# Patient Record
Sex: MALE | Race: WHITE | NOT HISPANIC OR LATINO | URBAN - METROPOLITAN AREA
[De-identification: names, ages, dates, MRNs, and addresses within clinical notes are randomized per-mention and may not be internally consistent; named-entity substitution may affect disease eponyms.]

---

## 2024-05-29 ENCOUNTER — INPATIENT (INPATIENT)
Facility: HOSPITAL | Age: 79
LOS: 4 days | Discharge: HOME | DRG: 373 | End: 2024-06-03
Attending: STUDENT IN AN ORGANIZED HEALTH CARE EDUCATION/TRAINING PROGRAM | Admitting: STUDENT IN AN ORGANIZED HEALTH CARE EDUCATION/TRAINING PROGRAM
Payer: MEDICARE

## 2024-05-29 VITALS
RESPIRATION RATE: 20 BRPM | WEIGHT: 182.98 LBS | OXYGEN SATURATION: 100 % | HEIGHT: 69 IN | TEMPERATURE: 96 F | HEART RATE: 76 BPM | SYSTOLIC BLOOD PRESSURE: 109 MMHG | DIASTOLIC BLOOD PRESSURE: 68 MMHG

## 2024-05-29 DIAGNOSIS — K68.3 RETROPERITONEAL HEMATOMA: ICD-10-CM

## 2024-05-29 LAB
A1C WITH ESTIMATED AVERAGE GLUCOSE RESULT: 5.1 % — SIGNIFICANT CHANGE UP (ref 4–5.6)
ALBUMIN SERPL ELPH-MCNC: 2.9 G/DL — LOW (ref 3.3–5.2)
ALBUMIN SERPL ELPH-MCNC: 3.1 G/DL — LOW (ref 3.3–5.2)
ALP SERPL-CCNC: 60 U/L — SIGNIFICANT CHANGE UP (ref 40–120)
ALP SERPL-CCNC: 62 U/L — SIGNIFICANT CHANGE UP (ref 40–120)
ALT FLD-CCNC: 12 U/L — SIGNIFICANT CHANGE UP
ALT FLD-CCNC: 12 U/L — SIGNIFICANT CHANGE UP
ANION GAP SERPL CALC-SCNC: 13 MMOL/L — SIGNIFICANT CHANGE UP (ref 5–17)
ANION GAP SERPL CALC-SCNC: 14 MMOL/L — SIGNIFICANT CHANGE UP (ref 5–17)
ANION GAP SERPL CALC-SCNC: 14 MMOL/L — SIGNIFICANT CHANGE UP (ref 5–17)
APTT BLD: 23.5 SEC — LOW (ref 24.5–35.6)
APTT BLD: 23.7 SEC — LOW (ref 24.5–35.6)
APTT BLD: 24.6 SEC — SIGNIFICANT CHANGE UP (ref 24.5–35.6)
AST SERPL-CCNC: 16 U/L — SIGNIFICANT CHANGE UP
AST SERPL-CCNC: 17 U/L — SIGNIFICANT CHANGE UP
BILIRUB SERPL-MCNC: 1 MG/DL — SIGNIFICANT CHANGE UP (ref 0.4–2)
BILIRUB SERPL-MCNC: 1.8 MG/DL — SIGNIFICANT CHANGE UP (ref 0.4–2)
BLD GP AB SCN SERPL QL: SIGNIFICANT CHANGE UP
BUN SERPL-MCNC: 28.5 MG/DL — HIGH (ref 8–20)
BUN SERPL-MCNC: 30.5 MG/DL — HIGH (ref 8–20)
BUN SERPL-MCNC: 30.6 MG/DL — HIGH (ref 8–20)
CALCIUM SERPL-MCNC: 8.3 MG/DL — LOW (ref 8.4–10.5)
CALCIUM SERPL-MCNC: 8.6 MG/DL — SIGNIFICANT CHANGE UP (ref 8.4–10.5)
CALCIUM SERPL-MCNC: 8.9 MG/DL — SIGNIFICANT CHANGE UP (ref 8.4–10.5)
CHLORIDE SERPL-SCNC: 104 MMOL/L — SIGNIFICANT CHANGE UP (ref 96–108)
CHLORIDE SERPL-SCNC: 104 MMOL/L — SIGNIFICANT CHANGE UP (ref 96–108)
CHLORIDE SERPL-SCNC: 107 MMOL/L — SIGNIFICANT CHANGE UP (ref 96–108)
CO2 SERPL-SCNC: 17 MMOL/L — LOW (ref 22–29)
CREAT SERPL-MCNC: 0.8 MG/DL — SIGNIFICANT CHANGE UP (ref 0.5–1.3)
CREAT SERPL-MCNC: 0.95 MG/DL — SIGNIFICANT CHANGE UP (ref 0.5–1.3)
CREAT SERPL-MCNC: 0.95 MG/DL — SIGNIFICANT CHANGE UP (ref 0.5–1.3)
EGFR: 81 ML/MIN/1.73M2 — SIGNIFICANT CHANGE UP
EGFR: 81 ML/MIN/1.73M2 — SIGNIFICANT CHANGE UP
EGFR: 89 ML/MIN/1.73M2 — SIGNIFICANT CHANGE UP
ESTIMATED AVERAGE GLUCOSE: 100 MG/DL — SIGNIFICANT CHANGE UP (ref 68–114)
FIBRINOGEN PPP-MCNC: 235 MG/DL — SIGNIFICANT CHANGE UP (ref 200–450)
FIBRINOGEN PPP-MCNC: 245 MG/DL — SIGNIFICANT CHANGE UP (ref 200–450)
GAS PNL BLDA: SIGNIFICANT CHANGE UP
GLUCOSE BLDC GLUCOMTR-MCNC: 199 MG/DL — HIGH (ref 70–99)
GLUCOSE SERPL-MCNC: 153 MG/DL — HIGH (ref 70–99)
GLUCOSE SERPL-MCNC: 217 MG/DL — HIGH (ref 70–99)
GLUCOSE SERPL-MCNC: 220 MG/DL — HIGH (ref 70–99)
HCT VFR BLD CALC: 28.4 % — LOW (ref 39–50)
HCT VFR BLD CALC: 30 % — LOW (ref 39–50)
HCT VFR BLD CALC: 33.9 % — LOW (ref 39–50)
HGB BLD-MCNC: 10.2 G/DL — LOW (ref 13–17)
HGB BLD-MCNC: 10.4 G/DL — LOW (ref 13–17)
HGB BLD-MCNC: 11.8 G/DL — LOW (ref 13–17)
INR BLD: 1.01 RATIO — SIGNIFICANT CHANGE UP (ref 0.85–1.18)
INR BLD: 1.05 RATIO — SIGNIFICANT CHANGE UP (ref 0.85–1.18)
INR BLD: 1.05 RATIO — SIGNIFICANT CHANGE UP (ref 0.85–1.18)
MAGNESIUM SERPL-MCNC: 2 MG/DL — SIGNIFICANT CHANGE UP (ref 1.6–2.6)
MAGNESIUM SERPL-MCNC: 2.1 MG/DL — SIGNIFICANT CHANGE UP (ref 1.6–2.6)
MCHC RBC-ENTMCNC: 31.4 PG — SIGNIFICANT CHANGE UP (ref 27–34)
MCHC RBC-ENTMCNC: 31.8 PG — SIGNIFICANT CHANGE UP (ref 27–34)
MCHC RBC-ENTMCNC: 32.1 PG — SIGNIFICANT CHANGE UP (ref 27–34)
MCHC RBC-ENTMCNC: 34.7 GM/DL — SIGNIFICANT CHANGE UP (ref 32–36)
MCHC RBC-ENTMCNC: 34.8 GM/DL — SIGNIFICANT CHANGE UP (ref 32–36)
MCHC RBC-ENTMCNC: 35.9 GM/DL — SIGNIFICANT CHANGE UP (ref 32–36)
MCV RBC AUTO: 89.3 FL — SIGNIFICANT CHANGE UP (ref 80–100)
MCV RBC AUTO: 90.6 FL — SIGNIFICANT CHANGE UP (ref 80–100)
MCV RBC AUTO: 91.4 FL — SIGNIFICANT CHANGE UP (ref 80–100)
MRSA PCR RESULT.: SIGNIFICANT CHANGE UP
PHOSPHATE SERPL-MCNC: 3.9 MG/DL — SIGNIFICANT CHANGE UP (ref 2.4–4.7)
PHOSPHATE SERPL-MCNC: 4.1 MG/DL — SIGNIFICANT CHANGE UP (ref 2.4–4.7)
PLATELET # BLD AUTO: 170 K/UL — SIGNIFICANT CHANGE UP (ref 150–400)
PLATELET # BLD AUTO: 179 K/UL — SIGNIFICANT CHANGE UP (ref 150–400)
PLATELET # BLD AUTO: 188 K/UL — SIGNIFICANT CHANGE UP (ref 150–400)
POTASSIUM SERPL-MCNC: 4.4 MMOL/L — SIGNIFICANT CHANGE UP (ref 3.5–5.3)
POTASSIUM SERPL-MCNC: 4.5 MMOL/L — SIGNIFICANT CHANGE UP (ref 3.5–5.3)
POTASSIUM SERPL-MCNC: 4.8 MMOL/L — SIGNIFICANT CHANGE UP (ref 3.5–5.3)
POTASSIUM SERPL-SCNC: 4.4 MMOL/L — SIGNIFICANT CHANGE UP (ref 3.5–5.3)
POTASSIUM SERPL-SCNC: 4.5 MMOL/L — SIGNIFICANT CHANGE UP (ref 3.5–5.3)
POTASSIUM SERPL-SCNC: 4.8 MMOL/L — SIGNIFICANT CHANGE UP (ref 3.5–5.3)
PROT SERPL-MCNC: 5.2 G/DL — LOW (ref 6.6–8.7)
PROT SERPL-MCNC: 5.5 G/DL — LOW (ref 6.6–8.7)
PROTHROM AB SERPL-ACNC: 11.2 SEC — SIGNIFICANT CHANGE UP (ref 9.5–13)
PROTHROM AB SERPL-ACNC: 11.6 SEC — SIGNIFICANT CHANGE UP (ref 9.5–13)
PROTHROM AB SERPL-ACNC: 11.6 SEC — SIGNIFICANT CHANGE UP (ref 9.5–13)
RBC # BLD: 3.18 M/UL — LOW (ref 4.2–5.8)
RBC # BLD: 3.31 M/UL — LOW (ref 4.2–5.8)
RBC # BLD: 3.71 M/UL — LOW (ref 4.2–5.8)
RBC # FLD: 14.7 % — HIGH (ref 10.3–14.5)
RBC # FLD: 14.8 % — HIGH (ref 10.3–14.5)
RBC # FLD: 15.4 % — HIGH (ref 10.3–14.5)
S AUREUS DNA NOSE QL NAA+PROBE: SIGNIFICANT CHANGE UP
SODIUM SERPL-SCNC: 135 MMOL/L — SIGNIFICANT CHANGE UP (ref 135–145)
SODIUM SERPL-SCNC: 135 MMOL/L — SIGNIFICANT CHANGE UP (ref 135–145)
SODIUM SERPL-SCNC: 137 MMOL/L — SIGNIFICANT CHANGE UP (ref 135–145)
WBC # BLD: 10.01 K/UL — SIGNIFICANT CHANGE UP (ref 3.8–10.5)
WBC # BLD: 10.1 K/UL — SIGNIFICANT CHANGE UP (ref 3.8–10.5)
WBC # BLD: 11.92 K/UL — HIGH (ref 3.8–10.5)
WBC # FLD AUTO: 10.01 K/UL — SIGNIFICANT CHANGE UP (ref 3.8–10.5)
WBC # FLD AUTO: 10.1 K/UL — SIGNIFICANT CHANGE UP (ref 3.8–10.5)
WBC # FLD AUTO: 11.92 K/UL — HIGH (ref 3.8–10.5)

## 2024-05-29 PROCEDURE — 37242 VASC EMBOLIZE/OCCLUDE ARTERY: CPT

## 2024-05-29 PROCEDURE — 36247 INS CATH ABD/L-EXT ART 3RD: CPT | Mod: RT

## 2024-05-29 PROCEDURE — 99291 CRITICAL CARE FIRST HOUR: CPT

## 2024-05-29 PROCEDURE — 99222 1ST HOSP IP/OBS MODERATE 55: CPT

## 2024-05-29 RX ORDER — CHLORHEXIDINE GLUCONATE 213 G/1000ML
1 SOLUTION TOPICAL DAILY
Refills: 0 | Status: DISCONTINUED | OUTPATIENT
Start: 2024-05-29 | End: 2024-06-03

## 2024-05-29 RX ORDER — DOXAZOSIN MESYLATE 4 MG
1 TABLET ORAL
Refills: 0 | DISCHARGE

## 2024-05-29 RX ORDER — FINASTERIDE 5 MG/1
5 TABLET, FILM COATED ORAL DAILY
Refills: 0 | Status: DISCONTINUED | OUTPATIENT
Start: 2024-05-29 | End: 2024-06-03

## 2024-05-29 RX ORDER — ACETAMINOPHEN 500 MG
1000 TABLET ORAL EVERY 6 HOURS
Refills: 0 | Status: DISCONTINUED | OUTPATIENT
Start: 2024-05-29 | End: 2024-06-03

## 2024-05-29 RX ORDER — INSULIN LISPRO 100/ML
VIAL (ML) SUBCUTANEOUS
Refills: 0 | Status: DISCONTINUED | OUTPATIENT
Start: 2024-05-29 | End: 2024-05-30

## 2024-05-29 RX ORDER — SODIUM CHLORIDE 9 MG/ML
1000 INJECTION, SOLUTION INTRAVENOUS
Refills: 0 | Status: DISCONTINUED | OUTPATIENT
Start: 2024-05-29 | End: 2024-05-30

## 2024-05-29 RX ORDER — TAMSULOSIN HYDROCHLORIDE 0.4 MG/1
0.4 CAPSULE ORAL AT BEDTIME
Refills: 0 | Status: DISCONTINUED | OUTPATIENT
Start: 2024-05-29 | End: 2024-05-31

## 2024-05-29 RX ORDER — ATORVASTATIN CALCIUM 80 MG/1
10 TABLET, FILM COATED ORAL AT BEDTIME
Refills: 0 | Status: DISCONTINUED | OUTPATIENT
Start: 2024-05-29 | End: 2024-06-03

## 2024-05-29 RX ORDER — DUTASTERIDE 0.5 MG/1
1 CAPSULE, LIQUID FILLED ORAL
Refills: 0 | DISCHARGE

## 2024-05-29 RX ORDER — DEXTROSE 50 % IN WATER 50 %
12.5 SYRINGE (ML) INTRAVENOUS ONCE
Refills: 0 | Status: DISCONTINUED | OUTPATIENT
Start: 2024-05-29 | End: 2024-05-30

## 2024-05-29 RX ORDER — ATORVASTATIN CALCIUM 80 MG/1
1 TABLET, FILM COATED ORAL
Refills: 0 | DISCHARGE

## 2024-05-29 RX ADMIN — SODIUM CHLORIDE 125 MILLILITER(S): 9 INJECTION, SOLUTION INTRAVENOUS at 16:55

## 2024-05-29 RX ADMIN — ATORVASTATIN CALCIUM 10 MILLIGRAM(S): 80 TABLET, FILM COATED ORAL at 21:00

## 2024-05-29 RX ADMIN — TAMSULOSIN HYDROCHLORIDE 0.4 MILLIGRAM(S): 0.4 CAPSULE ORAL at 22:00

## 2024-05-29 RX ADMIN — Medication 1: at 16:55

## 2024-05-29 NOTE — GOALS OF CARE CONVERSATION - ADVANCED CARE PLANNING - CONVERSATION DETAILS
Patient states that they would like there wife Lakesha Soriano - [phone]  to make decisions for them if the patient was unable to make decisions for themself.Discussed/defined DNR/DNI w/patient, including explaining the use of medications/electricity to restart the heart and the use of ETT/mechanical ventilation.  Patient is clear that they WOULD want chest compressions, shocks or medications to restart the heart should it stop beating or he have a rhythm not compatible with life.  Patient is also clear that they WOULD want to be intubated and put on a ventilator should they require it.     Patient to be: [X] FULL CODE

## 2024-05-29 NOTE — H&P ADULT - ASSESSMENT
ASSESSMENT: 78[place] [facility] [place] possibly [place] artery , on presentation patient complaining of flank pain , denies recent trauma / AC use, patient got 4 unit of PRBCs , 4 L of IVF , HD stable on presentation, AAOx3      PLAN:    - Admit to SICU  - Emergent IR intervention for hge control   - transfuse per parameter   - strict I/Os  - serial H/H   - pain control  - OOB/ambulate  - Plan discussed with Attending, Dr. Barreto

## 2024-05-29 NOTE — PATIENT PROFILE ADULT - FOOD INSECURITY
Received refill request from [facility] for cetirizine  Last refill: 09/15/2016 #30 R-5  Last office visit: 05/09/2017      Per  protocol medication refilled for a 6 month supply and E-prescribed to pharmacy.       no

## 2024-05-29 NOTE — CONSULT NOTE ADULT - ASSESSMENT
Patient is a 78 year-old transferred from [facility] [place] for retroperitoneal bleed. CTA positve for active right adrenal bleed. Patients has received 3 units of PRBC. Patient does require emergent embolization and will need additional contrast for this procedure. Scr was 1.1 prior to CTA today. will hydrate post procedure.     Please call extension [phone] with any questions, concerns or issues regarding above.  Patient is a 78 year-old [place] [facility] [place]. CTA positive for active right adrenal bleed. Patients has received 3 units of PRBC. Patient does require emergent embolization and will need additional contrast for this procedure. Scr was 1.1 prior to CTA today. will hydrate post procedure.     Please call extension [phone] with any questions, concerns or issues regarding above.

## 2024-05-29 NOTE — H&P ADULT - HISTORY OF PRESENT ILLNESS
HPI: 78y Male [place] [facility] [place] possibly from adrenal artery , on presentation patient complaining of flank pain , denies recent trauma / AC use, patient got 4 unit of PRBCs , 4 L of IVF , HD stable on presentation, AAOx3 , per EMS patient received ASA on presentation for suspected MI  , Patient denies fevers/chills, denies lightheadedness/dizziness, denies SOB/chest pain, denies nausea/vomiting,     ROS: 10-system review is otherwise negative except HPI above.      PAST MEDICAL & SURGICAL HISTORY:    FAMILY HISTORY:    Family history not pertinent as reviewed with the patient.    SOCIAL HISTORY:  Denies any toxic habits    ALLERGIES: NKA     Home Medications:      --------------------------------------------------------------------------------------------    PHYSICAL EXAM:   General: NAD, Lying in bed comfortably  Neuro: A+Ox3  HEENT: EOMI, PERRLA, MMM  Cardio: RRR  Resp: Non labored breathing on RA  GI/Abd: Soft, NT/ND, no rebound/guarding, no masses palpated  Vascular: All 4 extremities warm and well perfused.   Pelvis: stable  Musculoskeletal: All 4 extremities moving spontaneously, no limitations, no spinal tenderness.  --------------------------------------------------------------------------------------------    LABS                   CAPILLARY BLOOD GLUCOSE              --------------------------------------------------------------------------------------------       HPI: 78y [place] [facility] [place] extrav possibly from adrenal artery , on presentation patient complaining of flank pain , denies recent trauma / AC use, patient got 4 unit of PRBCs , 4 L of IVF , HD stable on presentation with no pressor requirement , AAOx3 , per EMS patient received ASA on presentation for suspected MI  , Patient denies fevers/chills, denies lightheadedness/dizziness, denies SOB/chest pain, denies nausea/vomiting,     ROS: 10-system review is otherwise negative except HPI above.      PAST MEDICAL & SURGICAL HISTORY:    FAMILY HISTORY:    Family history not pertinent as reviewed with the patient.    SOCIAL HISTORY:  Denies any toxic habits    ALLERGIES: NKA     Home Medications:      --------------------------------------------------------------------------------------------    PHYSICAL EXAM:   General: NAD, Lying in bed comfortably  Neuro: A+Ox3  HEENT: EOMI, PERRLA, MMM  Cardio: RRR  Resp: Non labored breathing on RA  GI/Abd: Soft, NT/ND, no rebound/guarding, no masses palpated  Vascular: All 4 extremities warm and well perfused.   Pelvis: stable  Musculoskeletal: All 4 extremities moving spontaneously, no limitations, no spinal tenderness.  --------------------------------------------------------------------------------------------    LABS                   CAPILLARY BLOOD GLUCOSE              --------------------------------------------------------------------------------------------

## 2024-05-29 NOTE — H&P ADULT - ATTENDING COMMENTS
78-year-old male with waldenstrom macroglobulinemia transferred from [facility] [place] with spontaneous retroperitoneal hematoma and acute hemodynamic instability secondary to hemorrhagic shock     - Admit to SICU   - CTA reviewed and case reviewed. Patient taken to IR for embolization of suprarenal artery pseudoaneurysm   - s/p 4 units PRBC, 1 FFP, 4L IVF   - balanced resuscitation. Consider platelets given recent ASA/Toradol administration   - serial abdominal exams   - trend HCT   - invasive hemodynamic monitoring   - Will need outpatient imaging to r/o malignancy     #Metabolic Acidosis: Monitor with volume resuscitation.   #acute blood loss anemia: trend and transfuse for HGB <7

## 2024-05-29 NOTE — PATIENT PROFILE ADULT - FALL HARM RISK - HARM RISK INTERVENTIONS

## 2024-05-29 NOTE — PATIENT PROFILE ADULT - FALL HARM RISK - FACTORS
Done. Pt informed of all her appts, mailed Ct scan and appt letter, pt will also see if daily have a pulmonary md close to her home and let us know  
I spoke to her, reviewed her studies, numerous findings.  Small lung nodule, bronchiectasis, ASCVD, left kidney cyst, diverticulosis, DJD    She was aware of most of these    Anticipate that we will do another CT scan in 1 year for the lung nodule    Recommend pulmonary consultation for bronchiectasis, please assist with appointment- referral is in    Lipid targets reviewed with regard to ASCVD, she is doing better but LDL is not at target range, will recheck labs in 2 months, orders in, please schedule    (if LDL not at target range will consider changing to Lipitor or Crestor)    Please mail copy of her CT scan which I have printed  
Post procedure
Pain, abdominal, epigastric

## 2024-05-29 NOTE — CONSULT NOTE ADULT - SUBJECTIVE AND OBJECTIVE BOX
Chief Complaint: 77yo Male who presents with lower back pain.  HPI:  Patient is an 78 year-old with a history of wladenstrom macroglobulinemia, HTN, HLD, who presented to [facility] today with right lower back pain since 4am. in the ED, patient synopsized and was found to be hypotensive requiring multiple blood transfusions. CTA revealed active Right RP bleed. Patient was helicoptered to [facility] for emergent IR embolization.     ============================================================================  Medications:  Home Medications:    MEDICATIONS  (STANDING):    MEDICATIONS  (PRN):      Allergies:     ============================================================================  PAST MEDICAL & SURGICAL HISTORY:      FAMILY HISTORY:      Social History:      ============================================================================  Vitals:      Labs (Per [facility] records):  wbc 12.5  rbc 3.08   hgb 9.8  hct 29.7  plt 247      Na 141  K 3.9  Cl 108  CO2 22  Cr 1.1  BUN 29          Imaging:   Pertinent Imaging Reviewed.    ============================================================================

## 2024-05-30 LAB
A1C WITH ESTIMATED AVERAGE GLUCOSE RESULT: 4.9 % — SIGNIFICANT CHANGE UP (ref 4–5.6)
ACETONE SERPL-MCNC: NEGATIVE — SIGNIFICANT CHANGE UP
ANION GAP SERPL CALC-SCNC: 10 MMOL/L — SIGNIFICANT CHANGE UP (ref 5–17)
ANION GAP SERPL CALC-SCNC: 12 MMOL/L — SIGNIFICANT CHANGE UP (ref 5–17)
ANION GAP SERPL CALC-SCNC: 13 MMOL/L — SIGNIFICANT CHANGE UP (ref 5–17)
ANION GAP SERPL CALC-SCNC: 15 MMOL/L — SIGNIFICANT CHANGE UP (ref 5–17)
APPEARANCE UR: CLEAR — SIGNIFICANT CHANGE UP
APTT BLD: 23.9 SEC — LOW (ref 24.5–35.6)
BACTERIA # UR AUTO: ABNORMAL /HPF
BILIRUB UR-MCNC: NEGATIVE — SIGNIFICANT CHANGE UP
BUN SERPL-MCNC: 29.2 MG/DL — HIGH (ref 8–20)
BUN SERPL-MCNC: 33.9 MG/DL — HIGH (ref 8–20)
BUN SERPL-MCNC: 36.6 MG/DL — HIGH (ref 8–20)
BUN SERPL-MCNC: 36.8 MG/DL — HIGH (ref 8–20)
CALCIUM SERPL-MCNC: 6.4 MG/DL — CRITICAL LOW (ref 8.4–10.5)
CALCIUM SERPL-MCNC: 8.1 MG/DL — LOW (ref 8.4–10.5)
CALCIUM SERPL-MCNC: 8.2 MG/DL — LOW (ref 8.4–10.5)
CALCIUM SERPL-MCNC: 8.7 MG/DL — SIGNIFICANT CHANGE UP (ref 8.4–10.5)
CAST: 8 /LPF — HIGH (ref 0–4)
CHLORIDE SERPL-SCNC: 104 MMOL/L — SIGNIFICANT CHANGE UP (ref 96–108)
CHLORIDE SERPL-SCNC: 107 MMOL/L — SIGNIFICANT CHANGE UP (ref 96–108)
CHLORIDE SERPL-SCNC: 107 MMOL/L — SIGNIFICANT CHANGE UP (ref 96–108)
CHLORIDE SERPL-SCNC: 113 MMOL/L — HIGH (ref 96–108)
CO2 SERPL-SCNC: 16 MMOL/L — LOW (ref 22–29)
CO2 SERPL-SCNC: 20 MMOL/L — LOW (ref 22–29)
CO2 SERPL-SCNC: 22 MMOL/L — SIGNIFICANT CHANGE UP (ref 22–29)
CO2 SERPL-SCNC: 23 MMOL/L — SIGNIFICANT CHANGE UP (ref 22–29)
COLOR SPEC: ABNORMAL
CREAT ?TM UR-MCNC: 161 MG/DL — SIGNIFICANT CHANGE UP
CREAT SERPL-MCNC: 0.72 MG/DL — SIGNIFICANT CHANGE UP (ref 0.5–1.3)
CREAT SERPL-MCNC: 0.87 MG/DL — SIGNIFICANT CHANGE UP (ref 0.5–1.3)
CREAT SERPL-MCNC: 1.08 MG/DL — SIGNIFICANT CHANGE UP (ref 0.5–1.3)
CREAT SERPL-MCNC: 1.12 MG/DL — SIGNIFICANT CHANGE UP (ref 0.5–1.3)
DIFF PNL FLD: ABNORMAL
EGFR: 66 ML/MIN/1.73M2 — SIGNIFICANT CHANGE UP
EGFR: 69 ML/MIN/1.73M2 — SIGNIFICANT CHANGE UP
EGFR: 87 ML/MIN/1.73M2 — SIGNIFICANT CHANGE UP
EGFR: 92 ML/MIN/1.73M2 — SIGNIFICANT CHANGE UP
ESTIMATED AVERAGE GLUCOSE: 94 MG/DL — SIGNIFICANT CHANGE UP (ref 68–114)
GLUCOSE BLDC GLUCOMTR-MCNC: 123 MG/DL — HIGH (ref 70–99)
GLUCOSE BLDC GLUCOMTR-MCNC: 135 MG/DL — HIGH (ref 70–99)
GLUCOSE BLDC GLUCOMTR-MCNC: 140 MG/DL — HIGH (ref 70–99)
GLUCOSE SERPL-MCNC: 111 MG/DL — HIGH (ref 70–99)
GLUCOSE SERPL-MCNC: 119 MG/DL — HIGH (ref 70–99)
GLUCOSE SERPL-MCNC: 126 MG/DL — HIGH (ref 70–99)
GLUCOSE SERPL-MCNC: 141 MG/DL — HIGH (ref 70–99)
GLUCOSE UR QL: NEGATIVE MG/DL — SIGNIFICANT CHANGE UP
HCT VFR BLD CALC: 22.5 % — LOW (ref 39–50)
HCT VFR BLD CALC: 23.2 % — LOW (ref 39–50)
HCT VFR BLD CALC: 24.4 % — LOW (ref 39–50)
HCT VFR BLD CALC: 24.8 % — LOW (ref 39–50)
HGB BLD-MCNC: 8 G/DL — LOW (ref 13–17)
HGB BLD-MCNC: 8 G/DL — LOW (ref 13–17)
HGB BLD-MCNC: 8.5 G/DL — LOW (ref 13–17)
HGB BLD-MCNC: 8.9 G/DL — LOW (ref 13–17)
INR BLD: 1.08 RATIO — SIGNIFICANT CHANGE UP (ref 0.85–1.18)
KETONES UR-MCNC: NEGATIVE MG/DL — SIGNIFICANT CHANGE UP
LACTATE SERPL-SCNC: 0.9 MMOL/L — SIGNIFICANT CHANGE UP (ref 0.5–2)
LEUKOCYTE ESTERASE UR-ACNC: NEGATIVE — SIGNIFICANT CHANGE UP
MAGNESIUM SERPL-MCNC: 1.7 MG/DL — SIGNIFICANT CHANGE UP (ref 1.6–2.6)
MAGNESIUM SERPL-MCNC: 2.2 MG/DL — SIGNIFICANT CHANGE UP (ref 1.6–2.6)
MAGNESIUM SERPL-MCNC: 2.2 MG/DL — SIGNIFICANT CHANGE UP (ref 1.8–2.6)
MAGNESIUM SERPL-MCNC: 2.3 MG/DL — SIGNIFICANT CHANGE UP (ref 1.6–2.6)
MCHC RBC-ENTMCNC: 31.8 PG — SIGNIFICANT CHANGE UP (ref 27–34)
MCHC RBC-ENTMCNC: 31.9 PG — SIGNIFICANT CHANGE UP (ref 27–34)
MCHC RBC-ENTMCNC: 32.2 PG — SIGNIFICANT CHANGE UP (ref 27–34)
MCHC RBC-ENTMCNC: 32.4 PG — SIGNIFICANT CHANGE UP (ref 27–34)
MCHC RBC-ENTMCNC: 34.5 GM/DL — SIGNIFICANT CHANGE UP (ref 32–36)
MCHC RBC-ENTMCNC: 34.8 GM/DL — SIGNIFICANT CHANGE UP (ref 32–36)
MCHC RBC-ENTMCNC: 35.6 GM/DL — SIGNIFICANT CHANGE UP (ref 32–36)
MCHC RBC-ENTMCNC: 35.9 GM/DL — SIGNIFICANT CHANGE UP (ref 32–36)
MCV RBC AUTO: 89.9 FL — SIGNIFICANT CHANGE UP (ref 80–100)
MCV RBC AUTO: 91.1 FL — SIGNIFICANT CHANGE UP (ref 80–100)
MCV RBC AUTO: 91.4 FL — SIGNIFICANT CHANGE UP (ref 80–100)
MCV RBC AUTO: 92.4 FL — SIGNIFICANT CHANGE UP (ref 80–100)
NITRITE UR-MCNC: NEGATIVE — SIGNIFICANT CHANGE UP
PH UR: 5.5 — SIGNIFICANT CHANGE UP (ref 5–8)
PHOSPHATE SERPL-MCNC: 3.3 MG/DL — SIGNIFICANT CHANGE UP (ref 2.4–4.7)
PHOSPHATE SERPL-MCNC: 3.8 MG/DL — SIGNIFICANT CHANGE UP (ref 2.4–4.7)
PHOSPHATE SERPL-MCNC: 4.4 MG/DL — SIGNIFICANT CHANGE UP (ref 2.4–4.7)
PHOSPHATE SERPL-MCNC: 4.5 MG/DL — SIGNIFICANT CHANGE UP (ref 2.4–4.7)
PLATELET # BLD AUTO: 134 K/UL — LOW (ref 150–400)
PLATELET # BLD AUTO: 146 K/UL — LOW (ref 150–400)
PLATELET # BLD AUTO: 152 K/UL — SIGNIFICANT CHANGE UP (ref 150–400)
PLATELET # BLD AUTO: 166 K/UL — SIGNIFICANT CHANGE UP (ref 150–400)
POTASSIUM SERPL-MCNC: 3.6 MMOL/L — SIGNIFICANT CHANGE UP (ref 3.5–5.3)
POTASSIUM SERPL-MCNC: 4.1 MMOL/L — SIGNIFICANT CHANGE UP (ref 3.5–5.3)
POTASSIUM SERPL-MCNC: 4.4 MMOL/L — SIGNIFICANT CHANGE UP (ref 3.5–5.3)
POTASSIUM SERPL-MCNC: 4.4 MMOL/L — SIGNIFICANT CHANGE UP (ref 3.5–5.3)
POTASSIUM SERPL-SCNC: 3.6 MMOL/L — SIGNIFICANT CHANGE UP (ref 3.5–5.3)
POTASSIUM SERPL-SCNC: 4.1 MMOL/L — SIGNIFICANT CHANGE UP (ref 3.5–5.3)
POTASSIUM SERPL-SCNC: 4.4 MMOL/L — SIGNIFICANT CHANGE UP (ref 3.5–5.3)
POTASSIUM SERPL-SCNC: 4.4 MMOL/L — SIGNIFICANT CHANGE UP (ref 3.5–5.3)
PROT UR-MCNC: 30 MG/DL
PROTHROM AB SERPL-ACNC: 12 SEC — SIGNIFICANT CHANGE UP (ref 9.5–13)
RBC # BLD: 2.47 M/UL — LOW (ref 4.2–5.8)
RBC # BLD: 2.51 M/UL — LOW (ref 4.2–5.8)
RBC # BLD: 2.67 M/UL — LOW (ref 4.2–5.8)
RBC # BLD: 2.76 M/UL — LOW (ref 4.2–5.8)
RBC # FLD: 15.5 % — HIGH (ref 10.3–14.5)
RBC # FLD: 15.6 % — HIGH (ref 10.3–14.5)
RBC # FLD: 15.7 % — HIGH (ref 10.3–14.5)
RBC # FLD: 15.8 % — HIGH (ref 10.3–14.5)
RBC CASTS # UR COMP ASSIST: 9 /HPF — HIGH (ref 0–4)
SODIUM SERPL-SCNC: 140 MMOL/L — SIGNIFICANT CHANGE UP (ref 135–145)
SODIUM SERPL-SCNC: 140 MMOL/L — SIGNIFICANT CHANGE UP (ref 135–145)
SODIUM SERPL-SCNC: 141 MMOL/L — SIGNIFICANT CHANGE UP (ref 135–145)
SODIUM SERPL-SCNC: 141 MMOL/L — SIGNIFICANT CHANGE UP (ref 135–145)
SODIUM UR-SCNC: 39 MMOL/L — SIGNIFICANT CHANGE UP
SP GR SPEC: >1.03 — HIGH (ref 1–1.03)
SQUAMOUS # UR AUTO: 1 /HPF — SIGNIFICANT CHANGE UP (ref 0–5)
UROBILINOGEN FLD QL: 1 MG/DL — SIGNIFICANT CHANGE UP (ref 0.2–1)
WBC # BLD: 10.1 K/UL — SIGNIFICANT CHANGE UP (ref 3.8–10.5)
WBC # BLD: 10.3 K/UL — SIGNIFICANT CHANGE UP (ref 3.8–10.5)
WBC # BLD: 10.5 K/UL — SIGNIFICANT CHANGE UP (ref 3.8–10.5)
WBC # BLD: 11.34 K/UL — HIGH (ref 3.8–10.5)
WBC # FLD AUTO: 10.1 K/UL — SIGNIFICANT CHANGE UP (ref 3.8–10.5)
WBC # FLD AUTO: 10.3 K/UL — SIGNIFICANT CHANGE UP (ref 3.8–10.5)
WBC # FLD AUTO: 10.5 K/UL — SIGNIFICANT CHANGE UP (ref 3.8–10.5)
WBC # FLD AUTO: 11.34 K/UL — HIGH (ref 3.8–10.5)
WBC UR QL: 9 /HPF — HIGH (ref 0–5)

## 2024-05-30 PROCEDURE — 99233 SBSQ HOSP IP/OBS HIGH 50: CPT

## 2024-05-30 PROCEDURE — 99231 SBSQ HOSP IP/OBS SF/LOW 25: CPT

## 2024-05-30 RX ORDER — HEPARIN SODIUM 5000 [USP'U]/ML
5000 INJECTION INTRAVENOUS; SUBCUTANEOUS EVERY 8 HOURS
Refills: 0 | Status: DISCONTINUED | OUTPATIENT
Start: 2024-05-30 | End: 2024-05-30

## 2024-05-30 RX ORDER — INSULIN LISPRO 100/ML
VIAL (ML) SUBCUTANEOUS EVERY 6 HOURS
Refills: 0 | Status: DISCONTINUED | OUTPATIENT
Start: 2024-05-30 | End: 2024-05-31

## 2024-05-30 RX ORDER — ENOXAPARIN SODIUM 100 MG/ML
40 INJECTION SUBCUTANEOUS EVERY 24 HOURS
Refills: 0 | Status: DISCONTINUED | OUTPATIENT
Start: 2024-05-30 | End: 2024-05-31

## 2024-05-30 RX ORDER — SODIUM CHLORIDE 9 MG/ML
1000 INJECTION, SOLUTION INTRAVENOUS
Refills: 0 | Status: DISCONTINUED | OUTPATIENT
Start: 2024-05-30 | End: 2024-05-30

## 2024-05-30 RX ORDER — HEPARIN SODIUM 5000 [USP'U]/ML
5000 INJECTION INTRAVENOUS; SUBCUTANEOUS ONCE
Refills: 0 | Status: DISCONTINUED | OUTPATIENT
Start: 2024-05-30 | End: 2024-05-30

## 2024-05-30 RX ADMIN — SODIUM CHLORIDE 50 MILLILITER(S): 9 INJECTION, SOLUTION INTRAVENOUS at 08:20

## 2024-05-30 RX ADMIN — CHLORHEXIDINE GLUCONATE 1 APPLICATION(S): 213 SOLUTION TOPICAL at 12:10

## 2024-05-30 RX ADMIN — FINASTERIDE 5 MILLIGRAM(S): 5 TABLET, FILM COATED ORAL at 12:10

## 2024-05-30 RX ADMIN — ATORVASTATIN CALCIUM 10 MILLIGRAM(S): 80 TABLET, FILM COATED ORAL at 21:54

## 2024-05-30 NOTE — CHART NOTE - NSCHARTNOTEFT_GEN_A_CORE
Central Line Removal Note    PROCEDURE NOTE:  Central Line removal    Site:    [R]  IJ Triple Lumen Catheter    Explained the procedure. Placed in Trendelenburg. Dressing taken down, sutures removed, catheter removed and tip intact. Pressure applied for greater than 5 mins, no hematoma or bleeding noted. Patient tolerated procedure well. A dry sterile dressing applied.

## 2024-05-31 LAB
ANION GAP SERPL CALC-SCNC: 11 MMOL/L — SIGNIFICANT CHANGE UP (ref 5–17)
BASOPHILS # BLD AUTO: 0.02 K/UL — SIGNIFICANT CHANGE UP (ref 0–0.2)
BASOPHILS NFR BLD AUTO: 0.2 % — SIGNIFICANT CHANGE UP (ref 0–2)
BUN SERPL-MCNC: 30.1 MG/DL — HIGH (ref 8–20)
CALCIUM SERPL-MCNC: 8 MG/DL — LOW (ref 8.4–10.5)
CHLORIDE SERPL-SCNC: 105 MMOL/L — SIGNIFICANT CHANGE UP (ref 96–108)
CO2 SERPL-SCNC: 22 MMOL/L — SIGNIFICANT CHANGE UP (ref 22–29)
CREAT SERPL-MCNC: 0.8 MG/DL — SIGNIFICANT CHANGE UP (ref 0.5–1.3)
EGFR: 89 ML/MIN/1.73M2 — SIGNIFICANT CHANGE UP
EOSINOPHIL # BLD AUTO: 0 K/UL — SIGNIFICANT CHANGE UP (ref 0–0.5)
EOSINOPHIL NFR BLD AUTO: 0 % — SIGNIFICANT CHANGE UP (ref 0–6)
GLUCOSE SERPL-MCNC: 106 MG/DL — HIGH (ref 70–99)
HCT VFR BLD CALC: 20.8 % — CRITICAL LOW (ref 39–50)
HCT VFR BLD CALC: 21.4 % — LOW (ref 39–50)
HCT VFR BLD CALC: 23.3 % — LOW (ref 39–50)
HCT VFR BLD CALC: 23.7 % — LOW (ref 39–50)
HGB BLD-MCNC: 7.2 G/DL — LOW (ref 13–17)
HGB BLD-MCNC: 7.3 G/DL — LOW (ref 13–17)
HGB BLD-MCNC: 8 G/DL — LOW (ref 13–17)
HGB BLD-MCNC: 8.1 G/DL — LOW (ref 13–17)
IMM GRANULOCYTES NFR BLD AUTO: 0.6 % — SIGNIFICANT CHANGE UP (ref 0–0.9)
LYMPHOCYTES # BLD AUTO: 1.08 K/UL — SIGNIFICANT CHANGE UP (ref 1–3.3)
LYMPHOCYTES # BLD AUTO: 12.4 % — LOW (ref 13–44)
MAGNESIUM SERPL-MCNC: 2.1 MG/DL — SIGNIFICANT CHANGE UP (ref 1.6–2.6)
MANUAL SMEAR VERIFICATION: SIGNIFICANT CHANGE UP
MCHC RBC-ENTMCNC: 31.5 PG — SIGNIFICANT CHANGE UP (ref 27–34)
MCHC RBC-ENTMCNC: 32 PG — SIGNIFICANT CHANGE UP (ref 27–34)
MCHC RBC-ENTMCNC: 32.3 PG — SIGNIFICANT CHANGE UP (ref 27–34)
MCHC RBC-ENTMCNC: 32.7 PG — SIGNIFICANT CHANGE UP (ref 27–34)
MCHC RBC-ENTMCNC: 33.8 GM/DL — SIGNIFICANT CHANGE UP (ref 32–36)
MCHC RBC-ENTMCNC: 34.1 GM/DL — SIGNIFICANT CHANGE UP (ref 32–36)
MCHC RBC-ENTMCNC: 34.6 GM/DL — SIGNIFICANT CHANGE UP (ref 32–36)
MCHC RBC-ENTMCNC: 34.8 GM/DL — SIGNIFICANT CHANGE UP (ref 32–36)
MCV RBC AUTO: 92.2 FL — SIGNIFICANT CHANGE UP (ref 80–100)
MCV RBC AUTO: 92.8 FL — SIGNIFICANT CHANGE UP (ref 80–100)
MCV RBC AUTO: 94.5 FL — SIGNIFICANT CHANGE UP (ref 80–100)
MCV RBC AUTO: 94.8 FL — SIGNIFICANT CHANGE UP (ref 80–100)
MONOCYTES # BLD AUTO: 0.98 K/UL — HIGH (ref 0–0.9)
MONOCYTES NFR BLD AUTO: 11.2 % — SIGNIFICANT CHANGE UP (ref 2–14)
NEUTROPHILS # BLD AUTO: 6.59 K/UL — SIGNIFICANT CHANGE UP (ref 1.8–7.4)
NEUTROPHILS NFR BLD AUTO: 75.6 % — SIGNIFICANT CHANGE UP (ref 43–77)
PHOSPHATE SERPL-MCNC: 3 MG/DL — SIGNIFICANT CHANGE UP (ref 2.4–4.7)
PLAT MORPH BLD: NORMAL — SIGNIFICANT CHANGE UP
PLATELET # BLD AUTO: 129 K/UL — LOW (ref 150–400)
PLATELET # BLD AUTO: 132 K/UL — LOW (ref 150–400)
PLATELET # BLD AUTO: 164 K/UL — SIGNIFICANT CHANGE UP (ref 150–400)
PLATELET # BLD AUTO: 166 K/UL — SIGNIFICANT CHANGE UP (ref 150–400)
POLYCHROMASIA BLD QL SMEAR: SLIGHT — SIGNIFICANT CHANGE UP
POTASSIUM SERPL-MCNC: 4.5 MMOL/L — SIGNIFICANT CHANGE UP (ref 3.5–5.3)
POTASSIUM SERPL-SCNC: 4.5 MMOL/L — SIGNIFICANT CHANGE UP (ref 3.5–5.3)
RBC # BLD: 2.2 M/UL — LOW (ref 4.2–5.8)
RBC # BLD: 2.32 M/UL — LOW (ref 4.2–5.8)
RBC # BLD: 2.5 M/UL — LOW (ref 4.2–5.8)
RBC # BLD: 2.51 M/UL — LOW (ref 4.2–5.8)
RBC # FLD: 15 % — HIGH (ref 10.3–14.5)
RBC # FLD: 15.2 % — HIGH (ref 10.3–14.5)
RBC # FLD: 15.4 % — HIGH (ref 10.3–14.5)
RBC # FLD: 15.6 % — HIGH (ref 10.3–14.5)
RBC BLD AUTO: ABNORMAL
SODIUM SERPL-SCNC: 138 MMOL/L — SIGNIFICANT CHANGE UP (ref 135–145)
WBC # BLD: 8.36 K/UL — SIGNIFICANT CHANGE UP (ref 3.8–10.5)
WBC # BLD: 8.72 K/UL — SIGNIFICANT CHANGE UP (ref 3.8–10.5)
WBC # BLD: 9.06 K/UL — SIGNIFICANT CHANGE UP (ref 3.8–10.5)
WBC # BLD: 9.65 K/UL — SIGNIFICANT CHANGE UP (ref 3.8–10.5)
WBC # FLD AUTO: 8.36 K/UL — SIGNIFICANT CHANGE UP (ref 3.8–10.5)
WBC # FLD AUTO: 8.72 K/UL — SIGNIFICANT CHANGE UP (ref 3.8–10.5)
WBC # FLD AUTO: 9.06 K/UL — SIGNIFICANT CHANGE UP (ref 3.8–10.5)
WBC # FLD AUTO: 9.65 K/UL — SIGNIFICANT CHANGE UP (ref 3.8–10.5)

## 2024-05-31 PROCEDURE — 99231 SBSQ HOSP IP/OBS SF/LOW 25: CPT

## 2024-05-31 RX ORDER — POTASSIUM CHLORIDE 20 MEQ
40 PACKET (EA) ORAL ONCE
Refills: 0 | Status: COMPLETED | OUTPATIENT
Start: 2024-05-31 | End: 2024-05-31

## 2024-05-31 RX ORDER — DOXAZOSIN MESYLATE 4 MG
2 TABLET ORAL AT BEDTIME
Refills: 0 | Status: DISCONTINUED | OUTPATIENT
Start: 2024-05-31 | End: 2024-06-03

## 2024-05-31 RX ORDER — ENOXAPARIN SODIUM 100 MG/ML
40 INJECTION SUBCUTANEOUS EVERY 24 HOURS
Refills: 0 | Status: DISCONTINUED | OUTPATIENT
Start: 2024-05-31 | End: 2024-06-03

## 2024-05-31 RX ADMIN — Medication 40 MILLIEQUIVALENT(S): at 12:51

## 2024-05-31 RX ADMIN — Medication 2 MILLIGRAM(S): at 22:17

## 2024-05-31 RX ADMIN — ENOXAPARIN SODIUM 40 MILLIGRAM(S): 100 INJECTION SUBCUTANEOUS at 12:51

## 2024-05-31 RX ADMIN — FINASTERIDE 5 MILLIGRAM(S): 5 TABLET, FILM COATED ORAL at 12:51

## 2024-05-31 RX ADMIN — ATORVASTATIN CALCIUM 10 MILLIGRAM(S): 80 TABLET, FILM COATED ORAL at 22:17

## 2024-05-31 RX ADMIN — CHLORHEXIDINE GLUCONATE 1 APPLICATION(S): 213 SOLUTION TOPICAL at 12:51

## 2024-05-31 NOTE — CHART NOTE - NSCHARTNOTEFT_GEN_A_CORE
SICU TRANSFER NOTE  -----------------------------  ICU Admission Date: XXXXX  Transfer Date: 05-31-24 @ 17:42    Admission Diagnosis: Renal pseudoaneurysm , acute blood loss anemia      Active Problems/injuries: retroperitoneal hematoma    Procedures: 5/29 IR embo Rt inferior suprarenal aa and inferior adrenal aa     Consultants:  [ ] Cardiology  [ ] Endocrine  [ ] Infectious Disease  [ ] Medicine  [ ]Neurosurgery  [ ] Ortho       [ ] Weight Bearing Status:  [ ] Palliative       [ ] Advanced Directives:    [ ] Physical Medicine and Rehab       [ ] Disposition :   [ ] Plastics  [ ] Pulmonary    Medications  acetaminophen   IVPB .. 1000 milliGRAM(s) IV Intermittent every 6 hours PRN  atorvastatin 10 milliGRAM(s) Oral at bedtime  chlorhexidine 2% Cloths 1 Application(s) Topical daily  doxazosin 2 milliGRAM(s) Oral at bedtime  enoxaparin Injectable 40 milliGRAM(s) SubCutaneous every 24 hours  finasteride 5 milliGRAM(s) Oral daily      [x ] I attest I have reviewed and reconciled all medications prior to transfer    IV Fluids    Indication: XXXXXX    Antibiotics:    Indication: XXXXXXX End Date:XXXXXXX      I have discussed this case with RAJINDER Garces upon transfer and all questions regarding ICU course were answered.  The following items are to be followed up:      1. daily hgb, has been stable at around 8,  Had slow drift down to hgb of 8 today.  2. TOV (removed at 1700)  3. Monitoring Cr, downtrending  4. preparation of DC SICU TRANSFER NOTE  -----------------------------  ICU Admission Date: XXXXX  Transfer Date: 05-31-24 @ 17:42    Admission Diagnosis: Renal pseudoaneurysm , acute blood loss anemia      Active Problems/injuries: retroperitoneal hematoma    Procedures: 5/29 IR embo Rt inferior suprarenal aa and inferior adrenal aa     Consultants:  [ ] Cardiology  [ ] Endocrine  [ ] Infectious Disease  [ ] Medicine  [ ]Neurosurgery  [ ] Ortho       [ ] Weight Bearing Status:  [ ] Palliative       [ ] Advanced Directives:    [ ] Physical Medicine and Rehab       [ ] Disposition :   [ ] Plastics  [ ] Pulmonary    Medications  acetaminophen   IVPB .. 1000 milliGRAM(s) IV Intermittent every 6 hours PRN  atorvastatin 10 milliGRAM(s) Oral at bedtime  chlorhexidine 2% Cloths 1 Application(s) Topical daily  doxazosin 2 milliGRAM(s) Oral at bedtime  enoxaparin Injectable 40 milliGRAM(s) SubCutaneous every 24 hours  finasteride 5 milliGRAM(s) Oral daily      [x ] I attest I have reviewed and reconciled all medications prior to transfer    IV Fluids    Indication: XXXXXX    Antibiotics:    Indication: XXXXXXX End Date:XXXXXXX      I have discussed this case with RAJINDER Garces upon transfer and all questions regarding ICU course were answered.  The following items are to be followed up:      1. daily hgb, has been stable at around 8  2. TOV (removed at 1700)  3. Monitoring Cr, downtrending  4.  Lovenox started 5/31  5. Pt aware of 2.7cm Lt cyst  6. preparation of DC    FULL CODE

## 2024-05-31 NOTE — PHYSICAL THERAPY INITIAL EVALUATION ADULT - PERTINENT HX OF CURRENT PROBLEM, REHAB EVAL
78y Male Transferred from [place] complaining of flank pain and admitted with Retroperitoneal hematoma 2*2 active extravation from adrenal artery. Pt is s/p emobolization of inferior suprarenal artery with gel foam and coils.

## 2024-05-31 NOTE — CHART NOTE - NSCHARTNOTEFT_GEN_A_CORE
SICU TRANSFER NOTE    79 [place] anjelica [place] [facility] [place] from adrenal artery causing RP hematoma.  Hgb there was 9.8. He received 4 PRBC and 4L IVF prior to transfer. Hgb here 11.8 and drifted to 8.1 today.  He did receive 1 FFP in this hospital but he did NOT receive any PRBC in this hospital up to this point.   Mild increased Cr but improved.  Making 50 cc urine per hour.   Pelayo removed.  Started on Prophylactic Lovenox today  -----------------------------  ICU Admission Date: 5/29/24  Transfer Date: 05-31-24 @ 12:04    Admission Diagnosis:  1. Retroperitoneal bleed  2. Acute blood loss anemia  3. Metabolic acidosis    1. Anemia    Procedures:   5/29: IR 2 coil embolization with gel  foam to right inferior suprarenal artery and inferior adrenal artery.    Consultants:    IR    Medications  acetaminophen   IVPB .. 1000 milliGRAM(s) IV Intermittent every 6 hours PRN  atorvastatin 10 milliGRAM(s) Oral at bedtime  chlorhexidine 2% Cloths 1 Application(s) Topical daily  doxazosin 2 milliGRAM(s) Oral at bedtime  enoxaparin Injectable 40 milliGRAM(s) SubCutaneous every 24 hours  finasteride 5 milliGRAM(s) Oral daily  potassium chloride    Tablet ER 40 milliEquivalent(s) Oral once      [x] I attest I have reviewed and reconciled all medications prior to transfer        I have discussed this case with ACS team upon transfer and all questions regarding ICU course were answered.  The following items are to be followed up:    1. daily hgb.  Had slow drift down to hgb of 8 today.  2. TOV  3. Monitoring Cr.  4. preparation of DC

## 2024-05-31 NOTE — PHYSICAL THERAPY INITIAL EVALUATION ADULT - ADDITIONAL COMMENTS
Pt lives in a private home with his spouse. 2 steps to enter with handrails, 12 steps inside with handrails. Pt was independent PTA without an assist device. Pt owns no DME.

## 2024-06-01 LAB
ANION GAP SERPL CALC-SCNC: 12 MMOL/L — SIGNIFICANT CHANGE UP (ref 5–17)
BUN SERPL-MCNC: 18 MG/DL — SIGNIFICANT CHANGE UP (ref 8–20)
CALCIUM SERPL-MCNC: 8.2 MG/DL — LOW (ref 8.4–10.5)
CHLORIDE SERPL-SCNC: 102 MMOL/L — SIGNIFICANT CHANGE UP (ref 96–108)
CO2 SERPL-SCNC: 20 MMOL/L — LOW (ref 22–29)
CREAT SERPL-MCNC: 0.74 MG/DL — SIGNIFICANT CHANGE UP (ref 0.5–1.3)
EGFR: 92 ML/MIN/1.73M2 — SIGNIFICANT CHANGE UP
GLUCOSE SERPL-MCNC: 94 MG/DL — SIGNIFICANT CHANGE UP (ref 70–99)
HCT VFR BLD CALC: 20.9 % — CRITICAL LOW (ref 39–50)
HCT VFR BLD CALC: 21.2 % — LOW (ref 39–50)
HCT VFR BLD CALC: 22.1 % — LOW (ref 39–50)
HGB BLD-MCNC: 7.1 G/DL — LOW (ref 13–17)
HGB BLD-MCNC: 7.2 G/DL — LOW (ref 13–17)
HGB BLD-MCNC: 7.5 G/DL — LOW (ref 13–17)
MAGNESIUM SERPL-MCNC: 2.1 MG/DL — SIGNIFICANT CHANGE UP (ref 1.6–2.6)
MCHC RBC-ENTMCNC: 31.8 PG — SIGNIFICANT CHANGE UP (ref 27–34)
MCHC RBC-ENTMCNC: 32 PG — SIGNIFICANT CHANGE UP (ref 27–34)
MCHC RBC-ENTMCNC: 32.3 PG — SIGNIFICANT CHANGE UP (ref 27–34)
MCHC RBC-ENTMCNC: 33.5 GM/DL — SIGNIFICANT CHANGE UP (ref 32–36)
MCHC RBC-ENTMCNC: 33.9 GM/DL — SIGNIFICANT CHANGE UP (ref 32–36)
MCHC RBC-ENTMCNC: 34.4 GM/DL — SIGNIFICANT CHANGE UP (ref 32–36)
MCV RBC AUTO: 92.9 FL — SIGNIFICANT CHANGE UP (ref 80–100)
MCV RBC AUTO: 93.6 FL — SIGNIFICANT CHANGE UP (ref 80–100)
MCV RBC AUTO: 96.4 FL — SIGNIFICANT CHANGE UP (ref 80–100)
PHOSPHATE SERPL-MCNC: 2.7 MG/DL — SIGNIFICANT CHANGE UP (ref 2.4–4.7)
PLATELET # BLD AUTO: 130 K/UL — LOW (ref 150–400)
PLATELET # BLD AUTO: 134 K/UL — LOW (ref 150–400)
PLATELET # BLD AUTO: 137 K/UL — LOW (ref 150–400)
POTASSIUM SERPL-MCNC: 4.3 MMOL/L — SIGNIFICANT CHANGE UP (ref 3.5–5.3)
POTASSIUM SERPL-SCNC: 4.3 MMOL/L — SIGNIFICANT CHANGE UP (ref 3.5–5.3)
RBC # BLD: 2.2 M/UL — LOW (ref 4.2–5.8)
RBC # BLD: 2.25 M/UL — LOW (ref 4.2–5.8)
RBC # BLD: 2.36 M/UL — LOW (ref 4.2–5.8)
RBC # FLD: 14.7 % — HIGH (ref 10.3–14.5)
RBC # FLD: 14.7 % — HIGH (ref 10.3–14.5)
RBC # FLD: 15 % — HIGH (ref 10.3–14.5)
SODIUM SERPL-SCNC: 134 MMOL/L — LOW (ref 135–145)
WBC # BLD: 7.05 K/UL — SIGNIFICANT CHANGE UP (ref 3.8–10.5)
WBC # BLD: 7.58 K/UL — SIGNIFICANT CHANGE UP (ref 3.8–10.5)
WBC # BLD: 8.19 K/UL — SIGNIFICANT CHANGE UP (ref 3.8–10.5)
WBC # FLD AUTO: 7.05 K/UL — SIGNIFICANT CHANGE UP (ref 3.8–10.5)
WBC # FLD AUTO: 7.58 K/UL — SIGNIFICANT CHANGE UP (ref 3.8–10.5)
WBC # FLD AUTO: 8.19 K/UL — SIGNIFICANT CHANGE UP (ref 3.8–10.5)

## 2024-06-01 PROCEDURE — 99231 SBSQ HOSP IP/OBS SF/LOW 25: CPT | Mod: GC

## 2024-06-01 PROCEDURE — 74178 CT ABD&PLV WO CNTR FLWD CNTR: CPT | Mod: 26

## 2024-06-01 RX ADMIN — FINASTERIDE 5 MILLIGRAM(S): 5 TABLET, FILM COATED ORAL at 13:02

## 2024-06-01 RX ADMIN — Medication 2 MILLIGRAM(S): at 21:03

## 2024-06-01 RX ADMIN — CHLORHEXIDINE GLUCONATE 1 APPLICATION(S): 213 SOLUTION TOPICAL at 13:02

## 2024-06-01 RX ADMIN — ENOXAPARIN SODIUM 40 MILLIGRAM(S): 100 INJECTION SUBCUTANEOUS at 13:11

## 2024-06-01 RX ADMIN — ATORVASTATIN CALCIUM 10 MILLIGRAM(S): 80 TABLET, FILM COATED ORAL at 21:03

## 2024-06-02 LAB
HCT VFR BLD CALC: 24.7 % — LOW (ref 39–50)
HCT VFR BLD CALC: 25 % — LOW (ref 39–50)
HGB BLD-MCNC: 8.2 G/DL — LOW (ref 13–17)
HGB BLD-MCNC: 8.4 G/DL — LOW (ref 13–17)
MCHC RBC-ENTMCNC: 31.7 PG — SIGNIFICANT CHANGE UP (ref 27–34)
MCHC RBC-ENTMCNC: 32.3 PG — SIGNIFICANT CHANGE UP (ref 27–34)
MCHC RBC-ENTMCNC: 33.2 GM/DL — SIGNIFICANT CHANGE UP (ref 32–36)
MCHC RBC-ENTMCNC: 33.6 GM/DL — SIGNIFICANT CHANGE UP (ref 32–36)
MCV RBC AUTO: 95.4 FL — SIGNIFICANT CHANGE UP (ref 80–100)
MCV RBC AUTO: 96.2 FL — SIGNIFICANT CHANGE UP (ref 80–100)
PLATELET # BLD AUTO: 167 K/UL — SIGNIFICANT CHANGE UP (ref 150–400)
PLATELET # BLD AUTO: 171 K/UL — SIGNIFICANT CHANGE UP (ref 150–400)
RBC # BLD: 2.59 M/UL — LOW (ref 4.2–5.8)
RBC # BLD: 2.6 M/UL — LOW (ref 4.2–5.8)
RBC # FLD: 15.1 % — HIGH (ref 10.3–14.5)
RBC # FLD: 15.3 % — HIGH (ref 10.3–14.5)
WBC # BLD: 7.31 K/UL — SIGNIFICANT CHANGE UP (ref 3.8–10.5)
WBC # BLD: 7.66 K/UL — SIGNIFICANT CHANGE UP (ref 3.8–10.5)
WBC # FLD AUTO: 7.31 K/UL — SIGNIFICANT CHANGE UP (ref 3.8–10.5)
WBC # FLD AUTO: 7.66 K/UL — SIGNIFICANT CHANGE UP (ref 3.8–10.5)

## 2024-06-02 PROCEDURE — 99231 SBSQ HOSP IP/OBS SF/LOW 25: CPT

## 2024-06-02 RX ADMIN — ATORVASTATIN CALCIUM 10 MILLIGRAM(S): 80 TABLET, FILM COATED ORAL at 21:42

## 2024-06-02 RX ADMIN — Medication 2 MILLIGRAM(S): at 21:42

## 2024-06-02 RX ADMIN — FINASTERIDE 5 MILLIGRAM(S): 5 TABLET, FILM COATED ORAL at 13:00

## 2024-06-02 NOTE — PROGRESS NOTE ADULT - NS ATTEND AMEND GEN_ALL_CORE FT
Above assessment noted.  The patient was seen and examined by myself with the surgical PA.  The patient remains without abdominal pain, nausea, or vomit.  The patient received one unit of PRBC's yesterday with a rise in the Hgb from 7.1 to 7.5, this morning it is 8.4.  Abdomen is soft without localizing tenderness, no guarding, no rebound.  Repeat CT scan reveals an increase in the size of the hematoma with a small focus of air.  Will repeat the H/H later today.  If stable may consider possible discharge home. The air on CT scan may be related to the embolization, clinically doubt infection of the hematoma as the WBC is normal and the patient is afebrile.

## 2024-06-03 ENCOUNTER — TRANSCRIPTION ENCOUNTER (OUTPATIENT)
Age: 79
End: 2024-06-03

## 2024-06-03 VITALS
TEMPERATURE: 99 F | HEART RATE: 72 BPM | OXYGEN SATURATION: 93 % | RESPIRATION RATE: 18 BRPM | DIASTOLIC BLOOD PRESSURE: 65 MMHG | SYSTOLIC BLOOD PRESSURE: 135 MMHG

## 2024-06-03 LAB
ANION GAP SERPL CALC-SCNC: 13 MMOL/L — SIGNIFICANT CHANGE UP (ref 5–17)
BLD GP AB SCN SERPL QL: SIGNIFICANT CHANGE UP
BUN SERPL-MCNC: 19 MG/DL — SIGNIFICANT CHANGE UP (ref 8–20)
CALCIUM SERPL-MCNC: 7.9 MG/DL — LOW (ref 8.4–10.5)
CHLORIDE SERPL-SCNC: 101 MMOL/L — SIGNIFICANT CHANGE UP (ref 96–108)
CO2 SERPL-SCNC: 20 MMOL/L — LOW (ref 22–29)
CREAT SERPL-MCNC: 0.72 MG/DL — SIGNIFICANT CHANGE UP (ref 0.5–1.3)
EGFR: 92 ML/MIN/1.73M2 — SIGNIFICANT CHANGE UP
GLUCOSE SERPL-MCNC: 101 MG/DL — HIGH (ref 70–99)
HCT VFR BLD CALC: 22.9 % — LOW (ref 39–50)
HGB BLD-MCNC: 7.6 G/DL — LOW (ref 13–17)
MAGNESIUM SERPL-MCNC: 2 MG/DL — SIGNIFICANT CHANGE UP (ref 1.8–2.6)
MCHC RBC-ENTMCNC: 31.5 PG — SIGNIFICANT CHANGE UP (ref 27–34)
MCHC RBC-ENTMCNC: 33.2 GM/DL — SIGNIFICANT CHANGE UP (ref 32–36)
MCV RBC AUTO: 95 FL — SIGNIFICANT CHANGE UP (ref 80–100)
PHOSPHATE SERPL-MCNC: 3.5 MG/DL — SIGNIFICANT CHANGE UP (ref 2.4–4.7)
PLATELET # BLD AUTO: 168 K/UL — SIGNIFICANT CHANGE UP (ref 150–400)
POTASSIUM SERPL-MCNC: 4 MMOL/L — SIGNIFICANT CHANGE UP (ref 3.5–5.3)
POTASSIUM SERPL-SCNC: 4 MMOL/L — SIGNIFICANT CHANGE UP (ref 3.5–5.3)
RBC # BLD: 2.41 M/UL — LOW (ref 4.2–5.8)
RBC # FLD: 14.9 % — HIGH (ref 10.3–14.5)
SODIUM SERPL-SCNC: 134 MMOL/L — LOW (ref 135–145)
WBC # BLD: 6.59 K/UL — SIGNIFICANT CHANGE UP (ref 3.8–10.5)
WBC # FLD AUTO: 6.59 K/UL — SIGNIFICANT CHANGE UP (ref 3.8–10.5)

## 2024-06-03 PROCEDURE — 86900 BLOOD TYPING SEROLOGIC ABO: CPT

## 2024-06-03 PROCEDURE — 81001 URINALYSIS AUTO W/SCOPE: CPT

## 2024-06-03 PROCEDURE — 85610 PROTHROMBIN TIME: CPT

## 2024-06-03 PROCEDURE — 85384 FIBRINOGEN ACTIVITY: CPT

## 2024-06-03 PROCEDURE — 83036 HEMOGLOBIN GLYCOSYLATED A1C: CPT

## 2024-06-03 PROCEDURE — 84300 ASSAY OF URINE SODIUM: CPT

## 2024-06-03 PROCEDURE — 83735 ASSAY OF MAGNESIUM: CPT

## 2024-06-03 PROCEDURE — 85014 HEMATOCRIT: CPT

## 2024-06-03 PROCEDURE — 75726 ARTERY X-RAYS ABDOMEN: CPT

## 2024-06-03 PROCEDURE — 80048 BASIC METABOLIC PNL TOTAL CA: CPT

## 2024-06-03 PROCEDURE — 84132 ASSAY OF SERUM POTASSIUM: CPT

## 2024-06-03 PROCEDURE — 82435 ASSAY OF BLOOD CHLORIDE: CPT

## 2024-06-03 PROCEDURE — 99238 HOSP IP/OBS DSCHRG MGMT 30/<: CPT

## 2024-06-03 PROCEDURE — 84100 ASSAY OF PHOSPHORUS: CPT

## 2024-06-03 PROCEDURE — 85730 THROMBOPLASTIN TIME PARTIAL: CPT

## 2024-06-03 PROCEDURE — 86923 COMPATIBILITY TEST ELECTRIC: CPT

## 2024-06-03 PROCEDURE — P9016: CPT

## 2024-06-03 PROCEDURE — 74178 CT ABD&PLV WO CNTR FLWD CNTR: CPT | Mod: MC

## 2024-06-03 PROCEDURE — 87640 STAPH A DNA AMP PROBE: CPT

## 2024-06-03 PROCEDURE — 86901 BLOOD TYPING SEROLOGIC RH(D): CPT

## 2024-06-03 PROCEDURE — C1760: CPT

## 2024-06-03 PROCEDURE — 82947 ASSAY GLUCOSE BLOOD QUANT: CPT

## 2024-06-03 PROCEDURE — 82330 ASSAY OF CALCIUM: CPT

## 2024-06-03 PROCEDURE — C1769: CPT

## 2024-06-03 PROCEDURE — 75894 X-RAYS TRANSCATH THERAPY: CPT

## 2024-06-03 PROCEDURE — 75774 ARTERY X-RAY EACH VESSEL: CPT

## 2024-06-03 PROCEDURE — P9059: CPT

## 2024-06-03 PROCEDURE — 85025 COMPLETE CBC W/AUTO DIFF WBC: CPT

## 2024-06-03 PROCEDURE — 85027 COMPLETE CBC AUTOMATED: CPT

## 2024-06-03 PROCEDURE — 80053 COMPREHEN METABOLIC PANEL: CPT

## 2024-06-03 PROCEDURE — 87641 MR-STAPH DNA AMP PROBE: CPT

## 2024-06-03 PROCEDURE — 82803 BLOOD GASES ANY COMBINATION: CPT

## 2024-06-03 PROCEDURE — 97163 PT EVAL HIGH COMPLEX 45 MIN: CPT

## 2024-06-03 PROCEDURE — 36430 TRANSFUSION BLD/BLD COMPNT: CPT

## 2024-06-03 PROCEDURE — 82570 ASSAY OF URINE CREATININE: CPT

## 2024-06-03 PROCEDURE — 82009 KETONE BODYS QUAL: CPT

## 2024-06-03 PROCEDURE — C1894: CPT

## 2024-06-03 PROCEDURE — 83605 ASSAY OF LACTIC ACID: CPT

## 2024-06-03 PROCEDURE — 86850 RBC ANTIBODY SCREEN: CPT

## 2024-06-03 PROCEDURE — 85018 HEMOGLOBIN: CPT

## 2024-06-03 PROCEDURE — 36415 COLL VENOUS BLD VENIPUNCTURE: CPT

## 2024-06-03 PROCEDURE — 76942 ECHO GUIDE FOR BIOPSY: CPT

## 2024-06-03 PROCEDURE — 84295 ASSAY OF SERUM SODIUM: CPT

## 2024-06-03 PROCEDURE — 82962 GLUCOSE BLOOD TEST: CPT

## 2024-06-03 PROCEDURE — C1889: CPT

## 2024-06-03 RX ORDER — ACETAMINOPHEN 500 MG
975 TABLET ORAL EVERY 6 HOURS
Refills: 0 | Status: DISCONTINUED | OUTPATIENT
Start: 2024-06-03 | End: 2024-06-03

## 2024-06-03 RX ORDER — ACETAMINOPHEN 500 MG
3 TABLET ORAL
Qty: 0 | Refills: 0 | DISCHARGE
Start: 2024-06-03

## 2024-06-03 NOTE — PROGRESS NOTE ADULT - REASON FOR ADMISSION
active extrav
active extrav
active extrav, retroperitoneal bleed
active extrav

## 2024-06-03 NOTE — DISCHARGE NOTE PROVIDER - HOSPITAL COURSE
Admission HPI: 78[place] [facility] [place] artery , on presentation patient complaining of flank pain , denies recent trauma / AC use, patient got 4 unit of PRBCs , 4 L of IVF , HD stable on presentation with no pressor requirement , AAOx3 , per EMS patient received ASA on presentation for suspected MI  , Patient denies fevers/chills, denies lightheadedness/dizziness, denies SOB/chest pain, denies nausea/vomiting.    Hospital Course: Patient was taken to IR on day of admission for embolization of inferior suprarenal artery. Admitted to SICU post-op. Rcvd 2u PRBC total during hospital stay. Downgraded from SICU to floor on 5/31. Hgb since last transfusion on 6/1 ranged from 7.5>8.4. Pt hemodynamically stable. Seen by PT who state he has no skilled PT needs. Pt's pain has resolved, he is tolerating a diet, OOB ambulating. Stable for discharge.    Patient is advised to RETURN TO THE EMERGENCY DEPARTMENT for any of the following - worsening pain, fever/chills, nausea/vomiting, altered mental status, chest pain, shortness of breath, or any other new / worsening symptom.

## 2024-06-03 NOTE — PROGRESS NOTE ADULT - ASSESSMENT
78y Male admitted with Retroperitoneal hematoma s/p emobolization of inferior suprarenal artery with gel foam and coils. Patient is hemodynamically stable. No further IR interventions needed at this time. 
Patient is a 77 yo M who p/w a adrenal artery hemorrhage and RP hematoma, he is s/p IR embolization and currently has no evidence of active bleeding on his CT from 6/1.  His vital signs have been stable except for asymptomatic oxygen desaturation overnight.  There was a small decrease in his hgb this morning but he remains stable in the mid to high 7s.  He is clinically stable and asymptomatic with benign abd exam. Cr wnl.      Plan:   - Trend O2 sat off of oxygen this morning  - Trend vitals  - Multimodal pain control  - Encourage OOB and IS use  - Dispo: DC home today vs tomorrow pending O2 sats

## 2024-06-03 NOTE — DISCHARGE NOTE PROVIDER - NSDCMRMEDTOKEN_GEN_ALL_CORE_FT
acetaminophen 325 mg oral tablet: 3 tab(s) orally every 6 hours As needed Mild Pain (1 - 3)  atorvastatin 10 mg oral tablet: 1 tab(s) orally once a day  doxazosin 2 mg oral tablet: 1 tab(s) orally once a day  dutasteride 0.5 mg oral capsule: 1 cap(s) orally once a day

## 2024-06-03 NOTE — DISCHARGE NOTE PROVIDER - NSFOLLOWUPCLINICS_GEN_ALL_ED_FT
Location #1: bi-lateral pretibial region Treatment Number: 9 Patient Id: [id number] Time Frame For Treatment Response: 48 hours Detail Level: Detailed Dose Setting #1 (Mj/Cm2): 1571 Total Energy In Joules: 246.2 % Increase/Decrease From Last Treatment: 0 [facility] Acute Care Surgery  Acute Care Surgery  [address] [place] [id number]  Phone: [phone]  Fax:      Total Square Area In Cm2 (Required For Proper Billing): 200 Consent: Written consent obtained, risks reviewed including but not limited to crusting, scabbing, blistering, scarring, darker or lighter pigmentary change, incidental hair removal, bruising, and/or incomplete removal. Comments: Patient experienced some tenderness and some blistering on left leg. Kept dosing the same for this visit, did not treat blistered areas. Applied Aquaphor after treatment. Will continue to increase/decrease based upon assessment. Post-Care Instructions: I reviewed with the patient in detail post-care instructions. Patient should stay away from the sun and wear sun protection until treated areas are fully healed.

## 2024-06-03 NOTE — DISCHARGE NOTE PROVIDER - NSDCCPCAREPLAN_GEN_ALL_CORE_FT
PRINCIPAL DISCHARGE DIAGNOSIS  Diagnosis: Retroperitoneal hemorrhage  Assessment and Plan of Treatment: - FOLLOW UP: Please call and make an appointment with the Acute Care Surgery Clinic 10-14 days after discharge. Also, please call and make an appointment with your primary care physician as per your usual schedule.   - ACTIVITY: May return to normal activities as tolerated, however refrain from heavy lifting, strenuous activities, or contact sports / activities.  - DIET: May continue regular diet.  - MEDICATIONS: Please resume home medications as previously prescribed. You can take over-the-counter tylenol for pain relief, as needed.  Patient is advised to RETURN TO THE EMERGENCY DEPARTMENT for any of the following - worsening pain, fever/chills, nausea/vomiting, altered mental status, chest pain, shortness of breath, or any other new / worsening symptom.

## 2024-06-03 NOTE — PROGRESS NOTE ADULT - ATTENDING COMMENTS
Above assessment noted.  The patient was seen and examined by myself with the surgical PA and resident.  The patient is without abdominal pain, nausea, or vomit.  Abdomen is soft without localizing tenderness, no guarding, no rebound.  Hgb down to 7.1.  To receive one unit PRBC's.  Repeat CT scan. In discussion with the patient's wife at the bedside, she had a CT scan report from prior to this hospitalization that reveals he had a 1.4 x 1.4 cm right adrenal nodule that increased in size from 1 x 0.9 cm on prior CT scan.  This may be the source for the adrenal hemorrhage.   The patient and his wife were advised that we will need to await resolution of the hematoma and inflammatory change to follow up on the lesion.
Seen and examined during ACS rounds.   Remains comfortable and requesting dc home.   Off O2 x~3hrs this AM w/o respiratory distress.   H/H relatively stable; Hemodynamically stable.     --Ok for dc home today.

## 2024-06-03 NOTE — DISCHARGE NOTE NURSING/CASE MANAGEMENT/SOCIAL WORK - NSDCPEFALRISK_GEN_ALL_CORE
For information on Fall & Injury Prevention, visit: [email or web address] OR  [email or web address] OR  https://www.cdc.gov/steadi/patient.html

## 2024-06-03 NOTE — DISCHARGE NOTE NURSING/CASE MANAGEMENT/SOCIAL WORK - PATIENT PORTAL LINK FT
You can access the FollowMyHealth Patient Portal offered by [facility] by registering at the following website: [email or web address]. By joining [employer]’s FollowMyHealth portal, you will also be able to view your health information using other applications (apps) compatible with our system.

## 2024-06-03 NOTE — PROGRESS NOTE ADULT - SUBJECTIVE AND OBJECTIVE BOX
SICU Attending Progress Note    HPI: 78y Male admitted with Retroperitoneal hematoma s/p emobolization of inferior suprarenal artery with gel foam and coils.     24H Events:    - no acute events overnight, drop in Hgb this am to 7.3, asymptomatic, repeat up to 8.1 with no intervention       acetaminophen   IVPB .. 1000 milliGRAM(s) IV Intermittent every 6 hours PRN  atorvastatin 10 milliGRAM(s) Oral at bedtime  chlorhexidine 2% Cloths 1 Application(s) Topical daily  doxazosin 2 milliGRAM(s) Oral at bedtime  enoxaparin Injectable 40 milliGRAM(s) SubCutaneous every 24 hours  finasteride 5 milliGRAM(s) Oral daily  potassium chloride    Tablet ER 40 milliEquivalent(s) Oral once    T(C): 36.8 (05-31-24 @ 12:00), Max: 37.2 (05-30-24 @ 16:00)  HR: 75 (05-31-24 @ 12:00) (74 - 93)  BP: 142/58 (05-31-24 @ 11:00) (109/74 - 153/79)  RR: 31 (05-31-24 @ 12:00) (19 - 45)  SpO2: 96% (05-31-24 @ 12:00) (86% - 97%)      05-30-24 @ 07:01  -  05-31-24 @ 07:00  --------------------------------------------------------  IN: 1030 mL / OUT: 1140 mL / NET: -110 mL    05-31-24 @ 07:01  -  05-31-24 @ 12:45  --------------------------------------------------------  IN: 0 mL / OUT: 150 mL / NET: -150 mL        PHYSICAL EXAM:    Gen: A&O x 3, NAD    Neurological: GCS 15    Neck: supple, trachea midline    Pulmonary: non labored breathing, symmetrical    Cardiovascular: RRR    Gastrointestinal: soft, NTTP, ND    Genitourinary: waldron in place    Extremities: no peripheral edema    Skin: no skin tears or eccymoses    Musculoskeletal: moving all 4 extremities                            8.1    9.06  )-----------( 166      ( 31 May 2024 10:50 )             23.3   05-31    138  |  105  |  30.1<H>  ----------------------------<  106<H>  4.5   |  22.0  |  0.80    Ca    8.0<L>      31 May 2024 03:45  Phos  3.0     05-31  Mg     2.1     05-31    TPro  5.5<L>  /  Alb  3.1<L>  /  TBili  1.8  /  DBili  x   /  AST  17  /  ALT  12  /  AlkPhos  62  05-29          Assessment: 78yMale admitted with Retroperitoneal hematoma s/p emobolization of inferior suprarenal artery with gel foam and coils.       Plan:    - ok to start DVT prophylaxis today  - d/c chivo pt on home alpha blocker, void trial  - repeat H/H this afternoon, if stable ok for downgrade from ICU    
Subjective: Patient seen and examined at bedside, no acute complaints, pain well controlled, eager to go home. Passed his TOV.      MEDICATIONS  (STANDING):  atorvastatin 10 milliGRAM(s) Oral at bedtime  chlorhexidine 2% Cloths 1 Application(s) Topical daily  doxazosin 2 milliGRAM(s) Oral at bedtime  enoxaparin Injectable 40 milliGRAM(s) SubCutaneous every 24 hours  finasteride 5 milliGRAM(s) Oral daily    MEDICATIONS  (PRN):  acetaminophen   IVPB .. 1000 milliGRAM(s) IV Intermittent every 6 hours PRN Temp greater or equal to 38C (100.4F), Mild Pain (1 - 3)    Vital Signs Last 24 Hrs  T(C): 36.6 (01 Jun 2024 00:02), Max: 36.9 (31 May 2024 07:32)  T(F): 97.9 (01 Jun 2024 00:02), Max: 98.4 (31 May 2024 07:32)  HR: 85 (01 Jun 2024 00:02) (73 - 88)  BP: 139/65 (01 Jun 2024 00:02) (119/75 - 162/75)  BP(mean): 93 (01 Jun 2024 00:00) (80 - 108)  RR: 21 (01 Jun 2024 00:02) (19 - 38)  SpO2: 90% (01 Jun 2024 00:02) (90% - 100%)  Parameters below as of 01 Jun 2024 00:02  Patient On (Oxygen Delivery Method): room air    Physical Exam:  Constitutional: NAD  HEENT: PERRL, EOMI  Respiratory: Respirations non-labored, no accessory muscle use  Gastrointestinal: Soft, non-tender, nondistended    LABS: 6/1 labs pending     A: Patient is a 79 yo M with a adrenal artery hemorrhage and RP hematoma, last night his hgb dropped to 7.2.     Plan:   CTA pending   AM labs, trend h/h   Monitor Cr.  DVT ppx with Lovenox and SCDs    Pt aware of 2.7cm Lt cyst  DC planning to home     
Interventional Radiology Follow-Up Note    This is a 78y Male s/p embolization of inferior suprarenal artery on 5/29/24 in Interventional Radiology with Dr. Bowman    S: Patient seen and examined @ bedside. He reports feeling great, wants to go home     Medication:  MEDICATIONS  (STANDING):  atorvastatin 10 milliGRAM(s) Oral at bedtime  chlorhexidine 2% Cloths 1 Application(s) Topical daily  finasteride 5 milliGRAM(s) Oral daily  insulin lispro (ADMELOG) corrective regimen sliding scale   SubCutaneous every 6 hours  multiple electrolytes Injection Type 1 1000 milliLiter(s) (50 mL/Hr) IV Continuous <Continuous>  tamsulosin 0.4 milliGRAM(s) Oral at bedtime    MEDICATIONS  (PRN):  acetaminophen   IVPB .. 1000 milliGRAM(s) IV Intermittent every 6 hours PRN Temp greater or equal to 38C (100.4F), Mild Pain (1 - 3)      Vitals:  ICU Vital Signs Last 24 Hrs  T(C): 37.2 (30 May 2024 08:00), Max: 37.6 (29 May 2024 23:00)  T(F): 99 (30 May 2024 08:00), Max: 99.7 (29 May 2024 23:00)  HR: 77 (30 May 2024 08:00) (72 - 86)  BP: 124/63 (30 May 2024 08:00) (92/60 - 126/61)  BP(mean): 80 (30 May 2024 08:00) (71 - 89)  ABP: 111/50 (30 May 2024 07:15) (95/72 - 158/56)  ABP(mean): 69 (30 May 2024 07:15) (38 - 89)  RR: 27 (30 May 2024 08:00) (17 - 33)  SpO2: 96% (30 May 2024 08:00) (94% - 100%)    O2 Parameters below as of 30 May 2024 08:00  Patient On (Oxygen Delivery Method): room air    Physical Exam:  General: Nontoxic, in NAD, A&O x3.  Abdomen: soft, NTND, no peritoneal signs.  Extremities:  Right groin clean, dry and intact, soft with no evidence of hematoma, strong dp/pt pulses. No pedal edema or calf tenderness noted.    I&O's Detail    29 May 2024 07:01  -  30 May 2024 07:00  --------------------------------------------------------  IN:    multiple electrolytes Injection Type 1.: 1875 mL  Total IN: 1875 mL    OUT:    Indwelling Catheter - Urethral (mL): 605 mL  Total OUT: 605 mL    Total NET: 1270 mL    LABS:                        8.5    10.50 )-----------( 152      ( 30 May 2024 08:20 )             24.4     05-30    141  |  113<H>  |  29.2<H>  ----------------------------<  119<H>  3.6   |  16.0<L>  |  0.72    Ca    6.4<LL>      30 May 2024 08:20  Phos  3.3     05-30  Mg     1.7     05-30    TPro  5.5<L>  /  Alb  3.1<L>  /  TBili  1.8  /  DBili  x   /  AST  17  /  ALT  12  /  AlkPhos  62  05-29    PT/INR - ( 30 May 2024 03:00 )   PT: 12.0 sec;   INR: 1.08 ratio         PTT - ( 30 May 2024 03:00 )  PTT:23.9 sec  Urinalysis Basic - ( 30 May 2024 08:20 )    Color: x / Appearance: x / SG: x / pH: x  Gluc: 119 mg/dL / Ketone: x  / Bili: x / Urobili: x   Blood: x / Protein: x / Nitrite: x   Leuk Esterase: x / RBC: x / WBC x   Sq Epi: x / Non Sq Epi: x / Bacteria: x    
SICU Attending Progress Note    HPI: 78y Male admitted with Retroperitoneal hematoma s/p emobolization of inferior suprarenal artery with gel foam and coils.     24H Events:    - flown to [facility], went to IR for embolization, ICU for monitoring post procedure  - no hemodynamic stability      acetaminophen   IVPB .. 1000 milliGRAM(s) IV Intermittent every 6 hours PRN  atorvastatin 10 milliGRAM(s) Oral at bedtime  chlorhexidine 2% Cloths 1 Application(s) Topical daily  finasteride 5 milliGRAM(s) Oral daily  insulin lispro (ADMELOG) corrective regimen sliding scale   SubCutaneous every 6 hours  multiple electrolytes Injection Type 1 1000 milliLiter(s) IV Continuous <Continuous>  tamsulosin 0.4 milliGRAM(s) Oral at bedtime    T(C): 37.1 (05-30-24 @ 09:00), Max: 37.6 (05-29-24 @ 23:00)  HR: 80 (05-30-24 @ 09:00) (72 - 86)  BP: 115/61 (05-30-24 @ 09:00) (92/60 - 126/61)  RR: 26 (05-30-24 @ 09:00) (17 - 33)  SpO2: 94% (05-30-24 @ 09:00) (94% - 100%)      05-29-24 @ 07:01  -  05-30-24 @ 07:00  --------------------------------------------------------  IN: 1875 mL / OUT: 605 mL / NET: 1270 mL                          8.5    10.50 )-----------( 152      ( 30 May 2024 08:20 )             24.4   05-30    141  |  104  |  36.8<H>  ----------------------------<  126<H>  4.4   |  22.0  |  1.12    Ca    8.7      30 May 2024 09:20  Phos  4.5     05-30  Mg     2.3     05-30    TPro  5.5<L>  /  Alb  3.1<L>  /  TBili  1.8  /  DBili  x   /  AST  17  /  ALT  12  /  AlkPhos  62  05-29      PHYSICAL EXAM:    Gen: A&O x 3, NAD    Neurological: GCS 15    Neck: supple, trachea midline    Pulmonary: non labored breathing, symmetrical    Cardiovascular: RRR    Gastrointestinal: soft, NTTP, ND    Genitourinary: waldron in place    Extremities: no peripheral edema    Skin: no skin tears or eccymoses    Musculoskeletal: moving all 4 extremities              Assessment: 78y Male admitted with Retroperitoneal hematoma s/p emobolization of inferior suprarenal artery with gel foam and coils.       Plan:    - cont to trend H/H (pt with 2 gm drop this am, repeat stable, awaiting additional H/H this afternoon  - monitor acidosis, repeat BMP  - if H/H stable, can d/c a line, cordis and waldron  - npo until H/H stability (meds ok)  - low dose IVF @ 50  - PT eval this afternoon  - pt really wants to go home, discussed the importance of monitoring him, would plan to monitor in ICU today, consider possible downgrade this afternoon at the earliest pending stability and lab trends    
SURGERY        INTERVAL EVENTS/SUBJECTIVE:   RN reports that Mr. Soriano desaturated into the 80s overnight.  Mr. Soriano denies any SOB or difficulty breathing overnight.  He is very keen to go home and states he has been asymptomatic for the past few days.    ______________________________________________  OBJECTIVE:   T(C): 36.8 (06-03-24 @ 04:36), Max: 36.9 (06-02-24 @ 16:44)  HR: 74 (06-03-24 @ 04:36) (72 - 74)  BP: 131/72 (06-03-24 @ 04:36) (122/68 - 146/75)  RR: 18 (06-03-24 @ 04:36) (16 - 18)  SpO2: 91% (06-03-24 @ 04:36) (91% - 94%)  Wt(kg): --  CAPILLARY BLOOD GLUCOSE        I&O's Detail      Physical exam:  Gen: resting in bed comfortably in NAD  Chest: no increased WOB, regular inspiratory effort, currently on 2L NC which was turned off by surgery team on AM rounds  Abdomen: Soft, nontender, nondistended with no rebound tenderness or guarding  Vascular: DARSHAN BATISTA x4  NEURO: awake, alert  ______________________________________________  LABS:  CBC Full  -  ( 03 Jun 2024 05:22 )  WBC Count : 6.59 K/uL  RBC Count : 2.41 M/uL  Hemoglobin : 7.6 g/dL  Hematocrit : 22.9 %  Platelet Count - Automated : 168 K/uL  Mean Cell Volume : 95.0 fl  Mean Cell Hemoglobin : 31.5 pg  Mean Cell Hemoglobin Concentration : 33.2 gm/dL  Auto Neutrophil # : x  Auto Lymphocyte # : x  Auto Monocyte # : x  Auto Eosinophil # : x  Auto Basophil # : x  Auto Neutrophil % : x  Auto Lymphocyte % : x  Auto Monocyte % : x  Auto Eosinophil % : x  Auto Basophil % : x    06-03    134<L>  |  101  |  19.0  ----------------------------<  101<H>  4.0   |  20.0<L>  |  0.72    Ca    7.9<L>      03 Jun 2024 05:22  Phos  3.5     06-03  Mg     2.0     06-03      _____________________________________________  RADIOLOGY:  < from: CT Abdomen and Pelvis w/wo IV Cont (06.01.24 @ 11:15) >  ACC: [id number] EXAM:  CT ABDOMEN AND PELVIS WAW IC   ORDERED BY: SERA TRIPP     PROCEDURE DATE:  06/01/2024          INTERPRETATION:  CLINICAL INFORMATION: 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    ADDITIONAL CLINICAL INFORMATION: Adrenal disorder E27.9    COMPARISON: 5/29/2024 CT scan abdomen and pelvis    CONTRAST/COMPLICATIONS:  IV Contrast: Omnipaque 350  90 cc administered   10 cc discarded  Oral Contrast: NONE  Complications: None reported at time of study completion    PROCEDURE:  CT of the Abdomen and Pelvis was performed.  Sagittal and coronal reformats were performed.    FINDINGS:  LOWER CHEST: Increase in size of the small right pleural effusion and   compressive atelectasis at the right lung base. Interval development of a   small left pleural effusion and compressive atelectasis at the left lung   base.    LIVER: Focal area of subcapsular hyperenhancement with adjacent   hypoattenuating area on series 8 image32 in the right lobe is unchanged   and demonstrates progressive enhancement isoattenuating to liver on   delayed imaging.  BILE DUCTS: Normal caliber.  GALLBLADDER: Within normal limits.  SPLEEN: Within normal limits.  PANCREAS: Within normal limits.  ADRENALS: Left adrenal gland not identified. Please see below for   evaluation of the area of the right adrenal gland.  KIDNEYS/URETERS: Left  renal cyst. 2 mm nonobstructing right renal   calculi.    Again noted is a large amount of hemorrhage in the expected location of   the right adrenal gland as well is in the medial right perirenal space   and posterior to the third portion of the duodenum. The amount of   hemorrhage in the right suprarenal location is similar compared with the   prior examination although somewhat more organized than on the prior   exam. There is new embolization material just superior to the right   kidney and in a hematoma near the expected location of the right adrenal   gland.  There are now several droplets of air in this hematoma measuring   6.7 cm likely related to the interval procedure. There is increase in the   amount of hemorrhage anterior and inferior to the right kidney and   posterior to the third portion of the right duodenum on series 5 image 76   measuring 4.4 cm in AP dimension previously 3.5 cm in AP dimension and   slightly more inferiorly on series 5 image 88 measuring 5.6 cm in AP   dimension, previously 1.3 cm.    BLADDER: Small amount of air again noted within the bladder.  REPRODUCTIVE ORGANS: Prostate within normal limits.    BOWEL: No bowel obstruction. Appendix is normal.  PERITONEUM: No ascites.  VESSELS: Within normal limits.  RETROPERITONEUM/LYMPH NODES: No lymphadenopathy.  ABDOMINAL WALL: Subcutaneous droplets of air ventral abdominal wall right   lower quadrant likely related to subcutaneous injections.  BONES: Within normal limits.    IMPRESSION:  Increase in amount of hemorrhage in a perinephric location inferior to   the right kidney. The remainder of the right juhi-renal/adrenal   hemorrhage is stable. Post embolization changes near the expected   location of the right adrenal gland now noted.    No evidence of active arterial bleed.    Indeterminate lesion in subcapsular location right lobe of liver   unchanged since the recent prior CT scan dated 5/29/2014. Recommend   further evaluation with sonography.    --- End of Report ---            KELVIN COUGHLIN MD; Attending Radiologist  This document has been electronically signed. Jun 1 2024  2:17PM    < end of copied text >    
Subjective: Patient seen resting comfortably in bed without complaints or overnight events. Nurse states no complaints.       STATUS POST: IR Embo right inferior suprarenal aa, inferior adrenal aa       MEDICATIONS  (STANDING):  atorvastatin 10 milliGRAM(s) Oral at bedtime  chlorhexidine 2% Cloths 1 Application(s) Topical daily  doxazosin 2 milliGRAM(s) Oral at bedtime  enoxaparin Injectable 40 milliGRAM(s) SubCutaneous every 24 hours  finasteride 5 milliGRAM(s) Oral daily    MEDICATIONS  (PRN):  acetaminophen   IVPB .. 1000 milliGRAM(s) IV Intermittent every 6 hours PRN Temp greater or equal to 38C (100.4F), Mild Pain (1 - 3)      Vital Signs Last 24 Hrs  T(C): 36.8 (02 Jun 2024 00:00), Max: 37.1 (01 Jun 2024 04:02)  T(F): 98.3 (02 Jun 2024 00:00), Max: 98.8 (01 Jun 2024 04:02)  HR: 69 (02 Jun 2024 00:00) (69 - 79)  BP: 125/66 (02 Jun 2024 00:00) (125/66 - 155/75)  BP(mean): --  RR: 18 (02 Jun 2024 00:00) (18 - 22)  SpO2: 91% (02 Jun 2024 00:00) (91% - 99%)    Parameters below as of 02 Jun 2024 00:00  Patient On (Oxygen Delivery Method): room air        Physical Exam:    Constitutional: NAD  Respiratory: Respirations non-labored, no accessory muscle use  Gastrointestinal: Soft, non-tender, non-distended  Musculoskeletal:no limitation of movement      LABS:                        7.5    7.58  )-----------( 130      ( 01 Jun 2024 20:52 )             22.1     06-01    134<L>  |  102  |  18.0  ----------------------------<  94  4.3   |  20.0<L>  |  0.74    Ca    8.2<L>      01 Jun 2024 05:05  Phos  2.7     06-01  Mg     2.1     06-01        Urinalysis Basic - ( 01 Jun 2024 05:05 )    Color: x / Appearance: x / SG: x / pH: x  Gluc: 94 mg/dL / Ketone: x  / Bili: x / Urobili: x   Blood: x / Protein: x / Nitrite: x   Leuk Esterase: x / RBC: x / WBC x   Sq Epi: x / Non Sq Epi: x / Bacteria: x        A: Patient is a 79 yo M with a adrenal artery hemorrhage and RP hematoma, hgb at 7.5 from 7.1. CTA shows increase in amount of hemorrhage in perinephric location inferior to the right kidney and remainder of right juhi-renal/adrenal hemorrhage is stable.     Plan:   AM labs, trend h/h   monitor vitals   Monitor Cr.  DVT ppx with Lovenox and SCDs   dispo planning

## 2024-06-04 PROBLEM — Z00.00 ENCOUNTER FOR PREVENTIVE HEALTH EXAMINATION: Status: ACTIVE | Noted: 2024-06-04

## 2024-06-13 ENCOUNTER — APPOINTMENT (OUTPATIENT)
Dept: TRAUMA SURGERY | Facility: CLINIC | Age: 79
End: 2024-06-13